# Patient Record
Sex: FEMALE | Race: WHITE | URBAN - METROPOLITAN AREA
[De-identification: names, ages, dates, MRNs, and addresses within clinical notes are randomized per-mention and may not be internally consistent; named-entity substitution may affect disease eponyms.]

---

## 2022-04-28 ENCOUNTER — OFFICE VISIT (OUTPATIENT)
Dept: URBAN - METROPOLITAN AREA CLINIC 7 | Facility: CLINIC | Age: 22
End: 2022-04-28
Payer: COMMERCIAL

## 2022-04-28 DIAGNOSIS — E11.3393 TYPE 2 DIAB WITH MOD NONP RTNOP WITHOUT MACULAR EDEMA, BI: ICD-10-CM

## 2022-04-28 DIAGNOSIS — E11.3592 TYPE 2 DIAB WITH PROLIF DIAB RTNOP WITHOUT MCLR EDEMA, L EYE: Primary | ICD-10-CM

## 2022-04-28 DIAGNOSIS — H43.813 VITREOUS DEGENERATION, BILATERAL: ICD-10-CM

## 2022-04-28 DIAGNOSIS — H04.123 DRY EYE SYNDROME OF BILATERAL LACRIMAL GLANDS: ICD-10-CM

## 2022-04-28 PROCEDURE — 99204 OFFICE O/P NEW MOD 45 MIN: CPT | Performed by: OPHTHALMOLOGY

## 2022-04-28 PROCEDURE — 92134 CPTRZ OPH DX IMG PST SGM RTA: CPT | Performed by: OPHTHALMOLOGY

## 2022-04-28 ASSESSMENT — INTRAOCULAR PRESSURE
OD: 12
OS: 13

## 2022-04-28 NOTE — IMPRESSION/PLAN
Impression: NPDR, OU. Plan: DM since 2017. Mild. Exam and OCT reveal no DME OU. Observe. BS control. 

Return in 1 year for follow up, OCT OU, IVFA OD 1st